# Patient Record
Sex: MALE | Race: WHITE | NOT HISPANIC OR LATINO | Employment: OTHER | URBAN - METROPOLITAN AREA
[De-identification: names, ages, dates, MRNs, and addresses within clinical notes are randomized per-mention and may not be internally consistent; named-entity substitution may affect disease eponyms.]

---

## 2019-06-24 ENCOUNTER — OFFICE VISIT (OUTPATIENT)
Dept: URGENT CARE | Age: 69
End: 2019-06-24
Payer: MEDICARE

## 2019-06-24 DIAGNOSIS — H65.91 OTITIS MEDIA, SEROUS, TM RUPTURE, RIGHT: Primary | ICD-10-CM

## 2019-06-24 DIAGNOSIS — H72.91 OTITIS MEDIA, SEROUS, TM RUPTURE, RIGHT: Primary | ICD-10-CM

## 2019-06-24 PROCEDURE — 69210 REMOVE IMPACTED EAR WAX UNI: CPT | Performed by: PHYSICIAN ASSISTANT

## 2019-06-24 PROCEDURE — G0463 HOSPITAL OUTPT CLINIC VISIT: HCPCS | Performed by: PHYSICIAN ASSISTANT

## 2019-06-24 PROCEDURE — 99203 OFFICE O/P NEW LOW 30 MIN: CPT | Performed by: PHYSICIAN ASSISTANT

## 2019-06-24 RX ORDER — OFLOXACIN 3 MG/ML
10 SOLUTION AURICULAR (OTIC) DAILY
Qty: 5 ML | Refills: 0 | Status: SHIPPED | OUTPATIENT
Start: 2019-06-24 | End: 2019-07-02

## 2019-06-24 RX ORDER — NEOMYCIN SULFATE, POLYMYXIN B SULFATE AND HYDROCORTISONE 10; 3.5; 1 MG/ML; MG/ML; [USP'U]/ML
SUSPENSION/ DROPS AURICULAR (OTIC)
COMMUNITY
Start: 2019-06-24 | End: 2019-06-24

## 2019-06-24 RX ORDER — CEFDINIR 300 MG/1
CAPSULE ORAL
COMMUNITY
Start: 2019-06-24 | End: 2019-06-24

## 2019-06-24 RX ORDER — AMOXICILLIN 500 MG/1
500 CAPSULE ORAL EVERY 8 HOURS SCHEDULED
Qty: 21 CAPSULE | Refills: 0 | Status: SHIPPED | OUTPATIENT
Start: 2019-06-24 | End: 2019-07-01

## 2019-06-25 VITALS
HEART RATE: 86 BPM | OXYGEN SATURATION: 96 % | RESPIRATION RATE: 18 BRPM | WEIGHT: 251 LBS | BODY MASS INDEX: 37.18 KG/M2 | TEMPERATURE: 99.1 F | HEIGHT: 69 IN | DIASTOLIC BLOOD PRESSURE: 96 MMHG | SYSTOLIC BLOOD PRESSURE: 176 MMHG

## 2019-07-02 ENCOUNTER — OFFICE VISIT (OUTPATIENT)
Dept: URGENT CARE | Age: 69
End: 2019-07-02
Payer: MEDICARE

## 2019-07-02 ENCOUNTER — HOSPITAL ENCOUNTER (EMERGENCY)
Facility: HOSPITAL | Age: 69
Discharge: HOME | End: 2019-07-02
Attending: EMERGENCY MEDICINE
Payer: MEDICARE

## 2019-07-02 VITALS
TEMPERATURE: 98.4 F | WEIGHT: 247.58 LBS | BODY MASS INDEX: 36.56 KG/M2 | DIASTOLIC BLOOD PRESSURE: 95 MMHG | HEART RATE: 74 BPM | RESPIRATION RATE: 18 BRPM | SYSTOLIC BLOOD PRESSURE: 205 MMHG | OXYGEN SATURATION: 96 %

## 2019-07-02 VITALS
OXYGEN SATURATION: 98 % | SYSTOLIC BLOOD PRESSURE: 192 MMHG | TEMPERATURE: 99.7 F | HEART RATE: 74 BPM | DIASTOLIC BLOOD PRESSURE: 120 MMHG | BODY MASS INDEX: 35.55 KG/M2 | RESPIRATION RATE: 20 BRPM | HEIGHT: 69 IN | WEIGHT: 240 LBS

## 2019-07-02 DIAGNOSIS — I10 HYPERTENSION, UNSPECIFIED TYPE: Primary | ICD-10-CM

## 2019-07-02 DIAGNOSIS — I10 HYPERTENSION: Primary | ICD-10-CM

## 2019-07-02 LAB
ANION GAP SERPL CALCULATED.3IONS-SCNC: 10 MMOL/L (ref 4–13)
BACTERIA UR QL AUTO: ABNORMAL /HPF
BILIRUB UR QL STRIP: NEGATIVE
BUN SERPL-MCNC: 11 MG/DL (ref 5–25)
CALCIUM SERPL-MCNC: 9.4 MG/DL (ref 8.3–10.1)
CHLORIDE SERPL-SCNC: 107 MMOL/L (ref 100–108)
CLARITY UR: CLEAR
CO2 SERPL-SCNC: 28 MMOL/L (ref 21–32)
COLOR UR: YELLOW
CREAT SERPL-MCNC: 0.84 MG/DL (ref 0.6–1.3)
GFR SERPL CREATININE-BSD FRML MDRD: 89 ML/MIN/1.73SQ M
GLUCOSE SERPL-MCNC: 106 MG/DL (ref 65–140)
GLUCOSE UR STRIP-MCNC: NEGATIVE MG/DL
HGB UR QL STRIP.AUTO: ABNORMAL
KETONES UR STRIP-MCNC: NEGATIVE MG/DL
LEUKOCYTE ESTERASE UR QL STRIP: NEGATIVE
NITRITE UR QL STRIP: NEGATIVE
NON-SQ EPI CELLS URNS QL MICRO: ABNORMAL /HPF
PH UR STRIP.AUTO: 5.5 [PH] (ref 4.5–8)
POTASSIUM SERPL-SCNC: 3.4 MMOL/L (ref 3.5–5.3)
PROT UR STRIP-MCNC: NEGATIVE MG/DL
RBC #/AREA URNS AUTO: ABNORMAL /HPF
SODIUM SERPL-SCNC: 145 MMOL/L (ref 136–145)
SP GR UR STRIP.AUTO: 1.01 (ref 1–1.03)
UROBILINOGEN UR QL STRIP.AUTO: 0.2 E.U./DL
WBC #/AREA URNS AUTO: ABNORMAL /HPF

## 2019-07-02 PROCEDURE — 80048 BASIC METABOLIC PNL TOTAL CA: CPT | Performed by: EMERGENCY MEDICINE

## 2019-07-02 PROCEDURE — 81001 URINALYSIS AUTO W/SCOPE: CPT

## 2019-07-02 PROCEDURE — 99283 EMERGENCY DEPT VISIT LOW MDM: CPT | Performed by: EMERGENCY MEDICINE

## 2019-07-02 PROCEDURE — 36415 COLL VENOUS BLD VENIPUNCTURE: CPT | Performed by: EMERGENCY MEDICINE

## 2019-07-02 PROCEDURE — 99283 EMERGENCY DEPT VISIT LOW MDM: CPT

## 2019-07-02 PROCEDURE — G0463 HOSPITAL OUTPT CLINIC VISIT: HCPCS | Performed by: PHYSICIAN ASSISTANT

## 2019-07-02 PROCEDURE — 99213 OFFICE O/P EST LOW 20 MIN: CPT | Performed by: PHYSICIAN ASSISTANT

## 2019-07-02 RX ORDER — ST. JOHN'S WORT 300 MG
1 CAPSULE ORAL DAILY
COMMUNITY
Start: 2018-05-30

## 2019-07-02 RX ORDER — CHLORTHALIDONE 50 MG/1
50 TABLET ORAL DAILY
COMMUNITY
Start: 2018-05-30 | End: 2019-07-02

## 2019-07-02 RX ORDER — OFLOXACIN 3 MG/ML
SOLUTION/ DROPS OPHTHALMIC
Refills: 0 | COMMUNITY
Start: 2019-06-24

## 2019-07-02 RX ORDER — AMLODIPINE BESYLATE 10 MG/1
10 TABLET ORAL DAILY
COMMUNITY
Start: 2018-05-30

## 2019-07-02 RX ORDER — LOSARTAN POTASSIUM AND HYDROCHLOROTHIAZIDE 25; 100 MG/1; MG/1
1 TABLET ORAL DAILY
COMMUNITY
Start: 2018-05-30

## 2019-07-02 RX ORDER — AMLODIPINE BESYLATE 10 MG/1
10 TABLET ORAL DAILY
Qty: 30 TABLET | Refills: 0 | Status: SHIPPED | OUTPATIENT
Start: 2019-07-02 | End: 2019-08-01

## 2019-07-02 RX ORDER — FOLIC ACID 1 MG/1
TABLET ORAL
COMMUNITY
End: 2019-07-02

## 2019-07-02 NOTE — ED ATTENDING ATTESTATION
I, Dan Severance, DO, saw and evaluated the patient  I have discussed the patient with the resident/non-physician practitioner and agree with the resident's/non-physician practitioner's findings, Plan of Care, and MDM as documented in the resident's/non-physician practitioner's note, except where noted  All available labs and Radiology studies were reviewed  I was present for key portions of any procedure(s) performed by the resident/non-physician practitioner and I was immediately available to provide assistance  At this point I agree with the current assessment done in the Emergency Department  I have conducted an independent evaluation of this patient a history and physical is as follows:    71 y o  M w/ h/o HTN p/w high BP  Pt had a /130 while at the ENT today and was referred to the urgent care  Patient had a high blood pressure reading of 180/126 while at urgent care, so he was told to go to the ER  Pt is supposed to be on meds, but hasn't been for the last 6 months because his medications were recalled  He has not attempted to see his PCP and reports he is afraid to take medications now  He states he checks his BP at home and is usually around 140  He has no symptoms currently  Plan: High BP - Will check BMP to r/o BILLIE  Will encourage PCP f/u      Critical Care Time  Procedures

## 2019-07-02 NOTE — ED PROVIDER NOTES
History  Chief Complaint   Patient presents with    Hypertension     patient reports had appt at ENT today and BP reading was high  Referred to an urgent care  BP high at urgent care  referred to ED  hx of HTN on medication up until 6 months ago  states all medications were recalled so never restarted on new BP meds  no complaints offered at this time  77-year-old male with a history of hypertension off his medications for the last 6 months since his blood pressure pills were called by the pharmacy presenting emergency department for evaluation of asymptomatic hypertension identified ENT visit today  He was being seen for a follow-up visit after he had a bleeding wound in his outer ear canal   He denies any pain today or any complaints at all but he was noted to be hypertensive with a systolic blood pressure in the 221K and a diastolic in the low 252Z at the clinic  He was referred to urgent care where the blood pressure persisted and then he was referred to the emergency department  Denies any headache, vision changes, hearing changes, abdominal pain, hematuria, or any changes in his urination  He notes that he was on a combination medicine that included losartan and hydrochlorothiazide as well as 1 other medication within the pill but does not remember the dosing  He has not followed up with his primary care physician since he self-discontinued these medicines  He reports that he had been monitoring his blood pressure at home and it usually was in the low 684G systolic early, but for the last several weeks his blood pressure cuff has been broken and he has not been monitoring his blood pressure  He does note that he has been excessively angry recently due to [place]  Prior to Admission Medications   Prescriptions Last Dose Informant Patient Reported? Taking?    Cetirizine HCl (ZYRTEC ALLERGY) 10 MG CAPS   Yes No   Sig: Take 10 mg by mouth daily   Multiple Vitamins-Minerals (OCUVITE ADULT 50+ PO)  Self Yes No   Sig: Take by mouth   St Johns Wort 300 MG CAPS   Yes No   Sig: Take 1 capsule by mouth daily   amLODIPine (NORVASC) 10 mg tablet   Yes No   Sig: Take 10 mg by mouth daily   amoxicillin (AMOXIL) 500 mg capsule  Self No No   Sig: Take 1 capsule (500 mg total) by mouth every 8 (eight) hours for 7 days   aspirin (ECOTRIN LOW STRENGTH) 81 mg EC tablet  Self Yes No   Sig: Take 81 mg by mouth daily   losartan-hydrochlorothiazide (HYZAAR) 100-25 MG per tablet   Yes No   Sig: Take 1 tablet by mouth daily   ofloxacin (OCUFLOX) 0 3 % ophthalmic solution   Yes No   Sig: ADMINISTER 10 DROPS TO THE RIGHT EAR DAILY FOR 7 DAYS   tetanus-diphtheria-acellular pertussis (BOOSTRIX) injection   Yes No   Sig: TO BE ADMINISTERED BY PHARMACIST FOR IMMUNIZATION      Facility-Administered Medications: None       Past Medical History:   Diagnosis Date    Hearing loss in left ear 2012    sudden loss- worked up by ENT in [place]- normal mri per pt   Hypertension        Past Surgical History:   Procedure Laterality Date    CARPAL TUNNEL RELEASE      KNEE ARTHROPLASTY         History reviewed  No pertinent family history  I have reviewed and agree with the history as documented  Social History     Tobacco Use    Smoking status: Former Smoker    Smokeless tobacco: Never Used   Substance Use Topics    Alcohol use: Yes     Comment: social     Drug use: Never        Review of Systems   Constitutional: Negative for chills and fever  HENT: Negative for congestion and sore throat  Eyes: Negative for visual disturbance  Respiratory: Negative for cough and shortness of breath  Cardiovascular: Negative for chest pain and palpitations  Gastrointestinal: Negative for abdominal pain, diarrhea, nausea and vomiting  Genitourinary: Negative for difficulty urinating and dysuria  Musculoskeletal: Negative for myalgias  Skin: Negative for rash     Neurological: Negative for weakness, light-headedness, numbness and headaches  Physical Exam  ED Triage Vitals [07/02/19 1632]   Temperature Pulse Respirations Blood Pressure SpO2   98 4 °F (36 9 °C) 79 18 (!) 213/101 98 %      Temp Source Heart Rate Source Patient Position - Orthostatic VS BP Location FiO2 (%)   Temporal Monitor Sitting Right arm --      Pain Score       No Pain             Orthostatic Vital Signs  Vitals:    07/02/19 1632 07/02/19 1739   BP: (!) 213/101 (!) 205/95   Pulse: 79 74   Patient Position - Orthostatic VS: Sitting Lying       Physical Exam   Constitutional: He is oriented to person, place, and time  He appears well-developed and well-nourished  HENT:   Head: Normocephalic and atraumatic  Mouth/Throat: Oropharynx is clear and moist    Eyes: Pupils are equal, round, and reactive to light  EOM are normal    Neck: Normal range of motion  Neck supple  Cardiovascular: Normal rate, regular rhythm, normal heart sounds and intact distal pulses  Pulmonary/Chest: No respiratory distress  Musculoskeletal: Normal range of motion  He exhibits no edema or deformity  Neurological: He is alert and oriented to person, place, and time  Coordination normal    Skin: Skin is warm and dry  Psychiatric: He has a normal mood and affect  His behavior is normal    Nursing note and vitals reviewed        ED Medications  Medications - No data to display    Diagnostic Studies  Results Reviewed     Procedure Component Value Units Date/Time    Urine Microscopic [[id number]]  (Abnormal) Collected:  07/02/19 1743    Lab Status:  Final result Specimen:  Urine, Clean Catch Updated:  07/02/19 1810     RBC, UA 1-2 /hpf      WBC, UA 0-1 /hpf      Epithelial Cells Occasional /hpf      Bacteria, UA None Seen /hpf     Basic metabolic panel [[id number]]  (Abnormal) Collected:  07/02/19 1732    Lab Status:  Final result Specimen:  Blood from Arm, Right Updated:  07/02/19 1747     Sodium 145 mmol/L      Potassium 3 4 mmol/L      Chloride 107 mmol/L CO2 28 mmol/L      ANION GAP 10 mmol/L      BUN 11 mg/dL      Creatinine 0 84 mg/dL      Glucose 106 mg/dL      Calcium 9 4 mg/dL      eGFR 89 ml/min/1 73sq m     Narrative:       [place] guidelines for Chronic Kidney Disease (CKD):     Stage 1 with normal or high GFR (GFR > 90 mL/min/1 73 square meters)    Stage 2 Mild CKD (GFR = 60-89 mL/min/1 73 square meters)    Stage 3A Moderate CKD (GFR = 45-59 mL/min/1 73 square meters)    Stage 3B Moderate CKD (GFR = 30-44 mL/min/1 73 square meters)    Stage 4 Severe CKD (GFR = 15-29 mL/min/1 73 square meters)    Stage 5 End Stage CKD (GFR <15 mL/min/1 73 square meters)  Note: GFR calculation is accurate only with a steady state creatinine    POCT urinalysis dipstick [[id number]]  (Abnormal) Resulted:  07/02/19 1732    Lab Status:  Final result Specimen:  Urine Updated:  07/02/19 1734    ED Urine Macroscopic [[id number]]  (Abnormal) Collected:  07/02/19 1743    Lab Status:  Final result Specimen:  Urine Updated:  07/02/19 1732     Color, UA Yellow     Clarity, UA Clear     pH, UA 5 5     Leukocytes, UA Negative     Nitrite, UA Negative     Protein, UA Negative mg/dl      Glucose, UA Negative mg/dl      Ketones, UA Negative mg/dl      Urobilinogen, UA 0 2 E U /dl      Bilirubin, UA Negative     Blood, UA Trace     Specific [place], UA 1 015    Narrative:       CLINITEK RESULT                 No orders to display         Procedures  Procedures        ED Course  ED Course as of Jul 03 0042 Tue Jul 02, 2019   1821 Blood pressure remained elevated in the emergency department  Patient continues to be asymptomatic  No signs of end-organ injury on laboratory evaluation  Due to his persistently elevated blood pressure did discharge him on his regular dose of amlodipine, 10 mg and instructed to follow up with his PCP in 1 week  Return precautions discussed for signs of stroke, chest pain, abdominal pain, and hematuria  MDM  Number of Diagnoses or Management Options  Diagnosis management comments: 51-year-old male with asymptomatic hypertension after self discontinuing his medications 6 months ago  Has not had routine blood work in over 1 year  We will check a BMP and a urinalysis today  His blood pressure is elevated above 200 on presentation, will monitor in the emergency department  He is reluctant to restart medications today and says that his blood pressure is typically is higher while at a physician's office  The blood work and urinalysis are normal will recommend he follow up with his primary care physician within the next week for repeat blood pressure monitoring and possibly starting medications  Disposition  Final diagnoses:   Hypertension     Time reflects when diagnosis was documented in both MDM as applicable and the Disposition within this note     Time User Action Codes Description Comment    7/2/2019  5:43 PM Power Garcia Add [I10] Hypertension       ED Disposition     ED Disposition Condition Date/Time Comment    Discharge Stable Tue Jul 2, 2019  6:13 PM Coy Alaniz discharge to home/self care  Follow-up Information     Follow up With Specialties Details Why Contact Info    Chela May MD Internal Medicine Schedule an appointment as soon as possible for a visit in 1 week  [facility]  [place] [address]  [phone]            Discharge Medication List as of 7/2/2019  6:14 PM      START taking these medications    Details   !! amLODIPine (NORVASC) 10 mg tablet Take 1 tablet (10 mg total) by mouth daily for 30 days, Starting Tue 7/2/2019, Until Thu 8/1/2019, Print       !! - Potential duplicate medications found  Please discuss with provider        CONTINUE these medications which have NOT CHANGED    Details   !! amLODIPine (NORVASC) 10 mg tablet Take 10 mg by mouth daily, Starting Wed 5/30/2018, Historical Med      aspirin ([address]) 81 mg EC tablet Take 81 mg by mouth daily, Historical Med      Cetirizine HCl (ZYRTEC ALLERGY) 10 MG CAPS Take 10 mg by mouth daily, Starting Wed 5/30/2018, Historical Med      losartan-hydrochlorothiazide (HYZAAR) 100-25 MG per tablet Take 1 tablet by mouth daily, Starting Wed 5/30/2018, Historical Med      Multiple Vitamins-Minerals (OCUVITE ADULT 50+ PO) Take by mouth, Historical Med      ofloxacin (OCUFLOX) 0 3 % ophthalmic solution ADMINISTER 10 DROPS TO THE RIGHT EAR DAILY FOR 7 DAYS, Historical Med      St Johns Wort 300 MG CAPS Take 1 capsule by mouth daily, Starting Wed 5/30/2018, Historical Med      tetanus-diphtheria-acellular pertussis (BOOSTRIX) injection TO BE ADMINISTERED BY PHARMACIST FOR IMMUNIZATION, Historical Med       !! - Potential duplicate medications found  Please discuss with provider  STOP taking these medications       amoxicillin (AMOXIL) 500 mg capsule Comments:   Reason for Stopping:             No discharge procedures on file  ED Provider  Attending physically available and evaluated Navi Adkins I managed the patient along with the ED Attending      Electronically Signed by         Fidencio Chaves MD  07/03/19 7309

## 2019-07-02 NOTE — PROGRESS NOTES
[facility]        NAME: Timmy Ortiz is a 71 y o  male  : 1950    MRN: [id number]  DATE: 2019  TIME: 4:19 PM    Assessment and Plan   Hypertension, unspecified type [I10]  1  Hypertension, unspecified type       Patient was seen at [facility] today with a blood pressure reading 200/130  Patient was told to go to ER, he came here instead  Patient continues to have high blood pressure 180/126 manual   Patient otherwise asymptomatic  It is in patient's best interest to go to ER for further management  I offered ambulance to patient or to have patient picked up by friend  I advised patient not to drive  Patient refuses and states that he will go on his own  Had patient sign an AMA form  Patient Instructions       Go to ER    Chief Complaint     Chief Complaint   Patient presents with    Hypertension     Pt was at the ENT office today and when they checked his BP it was 200/130  PT was taking BP medications 6 months ago but states he stopped because the meds he was on were recalled  History of Present Illness       Hypertension   Chronicity: Pt has hx htn but states he thought it was under control  Not currently on  medicaitons for this  Episode onset: Patient was seen at [facility] today with a blood pressure reading 200/130  Patient was told to go to ER, he came here instead  Patient continues to have high blood pressure 180/126 manual   Patient otherwise asymptomatic  The problem is unchanged  Pertinent negatives include no blurred vision, chest pain, headaches, malaise/fatigue, palpitations, shortness of breath or sweats  There are no associated agents to hypertension  Risk factors for coronary artery disease include obesity, male gender and dyslipidemia (Hx HTN)  Compliance problems: Pt states he stopped taking medications when his BP medication was recalled  Believed he was under control at that time          Review of Systems   Review of Systems   Constitutional: Negative for chills, diaphoresis, fatigue, fever and malaise/fatigue  HENT: Negative for congestion, postnasal drip, sinus pressure, sore throat and trouble swallowing  Eyes: Negative  Negative for blurred vision  Respiratory: Negative for shortness of breath and wheezing  Cardiovascular: Negative  Negative for chest pain and palpitations  Gastrointestinal: Negative for abdominal distention, diarrhea, nausea and vomiting  Endocrine: Negative  Genitourinary: Negative for dysuria  Musculoskeletal: Negative  Skin: Negative for rash  Allergic/Immunologic: Negative  Neurological: Negative  Negative for light-headedness and headaches  Hematological: Negative  Psychiatric/Behavioral: Negative            Current Medications       Current Outpatient Medications:     aspirin (ECOTRIN LOW STRENGTH) 81 mg EC tablet, Take 81 mg by mouth daily, Disp: , Rfl:     Cetirizine HCl (ZYRTEC ALLERGY) 10 MG CAPS, Take 10 mg by mouth daily, Disp: , Rfl:     Multiple Vitamins-Minerals (OCUVITE ADULT 50+ PO), Take by mouth, Disp: , Rfl:     ofloxacin (OCUFLOX) 0 3 % ophthalmic solution, ADMINISTER 10 DROPS TO THE RIGHT EAR DAILY FOR 7 DAYS, Disp: , Rfl: 0    tetanus-diphtheria-acellular pertussis (BOOSTRIX) injection, TO BE ADMINISTERED BY PHARMACIST FOR IMMUNIZATION, Disp: , Rfl:     amLODIPine (NORVASC) 10 mg tablet, Take 10 mg by mouth daily, Disp: , Rfl:     losartan-hydrochlorothiazide (HYZAAR) 100-25 MG per tablet, Take 1 tablet by mouth daily, Disp: , Rfl:     St Johns Wort 300 MG CAPS, Take 1 capsule by mouth daily, Disp: , Rfl:     Current Allergies     Allergies as of 07/02/2019    (No Known Allergies)            The following portions of the patient's history were reviewed and updated as appropriate: allergies, current medications, past family history, past medical history, past social history, past surgical history and problem list      Past Medical History:   Diagnosis Date    Hearing loss in left ear 2012    sudden loss- worked up by ENT in [place]- normal mri per pt   Hypertension        Past Surgical History:   Procedure Laterality Date    CARPAL TUNNEL RELEASE      KNEE ARTHROPLASTY         History reviewed  No pertinent family history  Medications have been verified  Objective   BP (!) 192/120 (BP Location: Right arm, Patient Position: Sitting, Cuff Size: Large) Comment: Manual  Pulse 74   Temp 99 7 °F (37 6 °C)   Resp 20   Ht 5' 9" (1 753 m)   Wt 109 kg (240 lb)   SpO2 98%   BMI 35 44 kg/m²        Physical Exam     Physical Exam   Constitutional: He is oriented to person, place, and time  He appears well-developed and well-nourished  No distress  HENT:   Head: Normocephalic and atraumatic  Nose: Nose normal    Mouth/Throat: Oropharynx is clear and moist  No oropharyngeal exudate  Eyes: Pupils are equal, round, and reactive to light  Conjunctivae and EOM are normal  Right eye exhibits no discharge  Left eye exhibits no discharge  Neck: Normal range of motion  Neck supple  Cardiovascular: Normal rate, regular rhythm, normal heart sounds and intact distal pulses  Pulmonary/Chest: Effort normal and breath sounds normal  No respiratory distress  He has no wheezes  He has no rales  Musculoskeletal: He exhibits no edema  Neurological: He is alert and oriented to person, place, and time  Skin: Skin is warm  Capillary refill takes less than 2 seconds  No rash noted  He is not diaphoretic  Nursing note and vitals reviewed

## 2020-08-03 ENCOUNTER — HOSPITAL ENCOUNTER (EMERGENCY)
Facility: HOSPITAL | Age: 70
Discharge: HOME | End: 2020-08-03
Attending: EMERGENCY MEDICINE | Admitting: EMERGENCY MEDICINE
Payer: MEDICARE

## 2020-08-03 ENCOUNTER — APPOINTMENT (EMERGENCY)
Dept: RADIOLOGY | Facility: HOSPITAL | Age: 70
End: 2020-08-03
Payer: MEDICARE

## 2020-08-03 VITALS
DIASTOLIC BLOOD PRESSURE: 76 MMHG | BODY MASS INDEX: 35.49 KG/M2 | TEMPERATURE: 98.3 F | WEIGHT: 240.3 LBS | HEART RATE: 52 BPM | RESPIRATION RATE: 18 BRPM | OXYGEN SATURATION: 97 % | SYSTOLIC BLOOD PRESSURE: 171 MMHG

## 2020-08-03 DIAGNOSIS — R07.9 CHEST PAIN: ICD-10-CM

## 2020-08-03 DIAGNOSIS — R06.00 DYSPNEA: Primary | ICD-10-CM

## 2020-08-03 LAB
ANION GAP SERPL CALCULATED.3IONS-SCNC: 9 MMOL/L (ref 4–13)
ATRIAL RATE: 61 BPM
BASOPHILS # BLD AUTO: 0.02 THOUSANDS/ΜL (ref 0–0.1)
BASOPHILS NFR BLD AUTO: 0 % (ref 0–1)
BUN SERPL-MCNC: 11 MG/DL (ref 5–25)
CALCIUM SERPL-MCNC: 8.5 MG/DL (ref 8.3–10.1)
CHLORIDE SERPL-SCNC: 104 MMOL/L (ref 100–108)
CO2 SERPL-SCNC: 26 MMOL/L (ref 21–32)
CREAT SERPL-MCNC: 0.78 MG/DL (ref 0.6–1.3)
EOSINOPHIL # BLD AUTO: 0.06 THOUSAND/ΜL (ref 0–0.61)
EOSINOPHIL NFR BLD AUTO: 1 % (ref 0–6)
ERYTHROCYTE [DISTWIDTH] IN BLOOD BY AUTOMATED COUNT: 12.7 % (ref 11.6–15.1)
GFR SERPL CREATININE-BSD FRML MDRD: 91 ML/MIN/1.73SQ M
GLUCOSE SERPL-MCNC: 106 MG/DL (ref 65–140)
HCT VFR BLD AUTO: 44.5 % (ref 36.5–49.3)
HGB BLD-MCNC: 15.1 G/DL (ref 12–17)
IMM GRANULOCYTES # BLD AUTO: 0.01 THOUSAND/UL (ref 0–0.2)
IMM GRANULOCYTES NFR BLD AUTO: 0 % (ref 0–2)
LYMPHOCYTES # BLD AUTO: 1.84 THOUSANDS/ΜL (ref 0.6–4.47)
LYMPHOCYTES NFR BLD AUTO: 30 % (ref 14–44)
MCH RBC QN AUTO: 31.1 PG (ref 26.8–34.3)
MCHC RBC AUTO-ENTMCNC: 33.9 G/DL (ref 31.4–37.4)
MCV RBC AUTO: 92 FL (ref 82–98)
MONOCYTES # BLD AUTO: 0.43 THOUSAND/ΜL (ref 0.17–1.22)
MONOCYTES NFR BLD AUTO: 7 % (ref 4–12)
NEUTROPHILS # BLD AUTO: 3.7 THOUSANDS/ΜL (ref 1.85–7.62)
NEUTS SEG NFR BLD AUTO: 62 % (ref 43–75)
NRBC BLD AUTO-RTO: 0 /100 WBCS
NT-PROBNP SERPL-MCNC: 74 PG/ML
P AXIS: 49 DEGREES
PLATELET # BLD AUTO: 215 THOUSANDS/UL (ref 149–390)
PMV BLD AUTO: 9.5 FL (ref 8.9–12.7)
POTASSIUM SERPL-SCNC: 3.5 MMOL/L (ref 3.5–5.3)
PR INTERVAL: 222 MS
QRS AXIS: 96 DEGREES
QRSD INTERVAL: 104 MS
QT INTERVAL: 428 MS
QTC INTERVAL: 430 MS
RBC # BLD AUTO: 4.85 MILLION/UL (ref 3.88–5.62)
SODIUM SERPL-SCNC: 139 MMOL/L (ref 136–145)
T WAVE AXIS: 2 DEGREES
TROPONIN I SERPL-MCNC: 0.02 NG/ML
VENTRICULAR RATE: 61 BPM
WBC # BLD AUTO: 6.06 THOUSAND/UL (ref 4.31–10.16)

## 2020-08-03 PROCEDURE — 36415 COLL VENOUS BLD VENIPUNCTURE: CPT | Performed by: EMERGENCY MEDICINE

## 2020-08-03 PROCEDURE — 80048 BASIC METABOLIC PNL TOTAL CA: CPT | Performed by: EMERGENCY MEDICINE

## 2020-08-03 PROCEDURE — 93010 ELECTROCARDIOGRAM REPORT: CPT | Performed by: INTERNAL MEDICINE

## 2020-08-03 PROCEDURE — 84484 ASSAY OF TROPONIN QUANT: CPT | Performed by: EMERGENCY MEDICINE

## 2020-08-03 PROCEDURE — 93005 ELECTROCARDIOGRAM TRACING: CPT

## 2020-08-03 PROCEDURE — 99283 EMERGENCY DEPT VISIT LOW MDM: CPT | Performed by: EMERGENCY MEDICINE

## 2020-08-03 PROCEDURE — 99285 EMERGENCY DEPT VISIT HI MDM: CPT

## 2020-08-03 PROCEDURE — 71046 X-RAY EXAM CHEST 2 VIEWS: CPT

## 2020-08-03 PROCEDURE — 85025 COMPLETE CBC W/AUTO DIFF WBC: CPT | Performed by: EMERGENCY MEDICINE

## 2020-08-03 PROCEDURE — 83880 ASSAY OF NATRIURETIC PEPTIDE: CPT | Performed by: EMERGENCY MEDICINE

## 2020-08-03 RX ORDER — CHLORAL HYDRATE 500 MG
CAPSULE ORAL DAILY
COMMUNITY

## 2020-08-03 NOTE — ED PROVIDER NOTES
History  Chief Complaint   Patient presents with    Shortness of Breath     Pt  reports sob, mild cough, sneezing, congestion since yesterday  History provided by:  Patient   used: No    Shortness of Breath   Severity:  Moderate  Onset quality:  Gradual  Duration:  1 day  Timing:  Intermittent  Progression:  Resolved  Chronicity:  New  Context comment:  Sinusitis, blocked nose, also had chest pain yesterday  Relieved by:  Nothing  Worsened by:  Nothing  Ineffective treatments:  None tried  Associated symptoms: chest pain and cough    Associated symptoms: no abdominal pain, no fever, no headaches, no neck pain, no rash, no sore throat and no vomiting    Chest pain:     Quality: pressure      Severity:  Moderate    Onset quality:  Gradual    Duration: 15 minutes yesterday, now resolved  Timing:  Sporadic    Progression:  Resolved    Chronicity:  New  Cough:     Cough characteristics:  Non-productive    Sputum characteristics:  Nondescript    Severity:  Mild    Onset quality:  Gradual    Duration:  1 day    Timing:  Intermittent    Progression:  Waxing and waning    Chronicity:  New  Risk factors comment:  HTN      Prior to Admission Medications   Prescriptions Last Dose Informant Patient Reported? Taking?    Cetirizine HCl (ZYRTEC ALLERGY) 10 MG CAPS Not Taking at Unknown time  Yes No   Sig: Take 10 mg by mouth daily   Multiple Vitamins-Minerals (OCUVITE ADULT 50+ PO)  Self Yes Yes   Sig: Take by mouth   Omega-3 Fatty Acids (fish oil) 1,000 mg   Yes Yes   Sig: Take by mouth daily Unknown dose   [employer] Wort 300 MG CAPS   Yes Yes   Sig: Take 1 capsule by mouth daily   amLODIPine (NORVASC) 10 mg tablet   Yes Yes   Sig: Take 10 mg by mouth daily   aspirin (ECOTRIN LOW STRENGTH) 81 mg EC tablet  Self Yes Yes   Sig: Take 81 mg by mouth daily   losartan-hydrochlorothiazide (HYZAAR) 100-25 MG per tablet   Yes Yes   Sig: Take 1 tablet by mouth daily   ofloxacin (OCUFLOX) 0 3 % ophthalmic solution Not Taking at Unknown time  Yes No   Sig: ADMINISTER 10 DROPS TO THE RIGHT EAR DAILY FOR 7 DAYS   tetanus-diphtheria-acellular pertussis (BOOSTRIX) injection   Yes Yes   Sig: TO BE ADMINISTERED BY PHARMACIST FOR IMMUNIZATION      Facility-Administered Medications: None       Past Medical History:   Diagnosis Date    Hearing loss in left ear 2012    sudden loss- worked up by ENT in [place]- normal mri per pt   Hypertension        Past Surgical History:   Procedure Laterality Date    CARPAL TUNNEL RELEASE      KNEE ARTHROPLASTY         History reviewed  No pertinent family history  I have reviewed and agree with the history as documented  E-Cigarette/Vaping    E-Cigarette Use Never User      E-Cigarette/Vaping Substances     Social History     Tobacco Use    Smoking status: Former Smoker    Smokeless tobacco: Never Used   Substance Use Topics    Alcohol use: Yes     Comment: social     Drug use: Never       Review of Systems   Constitutional: Negative for chills and fever  HENT: Negative for facial swelling, sore throat and trouble swallowing  Eyes: Negative for pain and visual disturbance  Respiratory: Positive for cough and shortness of breath  Cardiovascular: Positive for chest pain  Negative for leg swelling  Gastrointestinal: Negative for abdominal pain, blood in stool, diarrhea, nausea and vomiting  Genitourinary: Negative for dysuria and flank pain  Musculoskeletal: Negative for back pain, neck pain and neck stiffness  Skin: Negative for pallor and rash  Allergic/Immunologic: Negative for environmental allergies and immunocompromised state  Neurological: Negative for dizziness and headaches  Hematological: Negative for adenopathy  Does not bruise/bleed easily  Psychiatric/Behavioral: Negative for agitation and behavioral problems  All other systems reviewed and are negative  Physical Exam  Physical Exam  Vitals signs and nursing note reviewed  Constitutional:       General: He is not in acute distress  Appearance: He is well-developed  HENT:      Head: Normocephalic and atraumatic  Neck:      Musculoskeletal: Normal range of motion and neck supple  Cardiovascular:      Rate and Rhythm: Normal rate and regular rhythm  Heart sounds: Normal heart sounds  Pulmonary:      Effort: Pulmonary effort is normal       Breath sounds: Normal breath sounds  Abdominal:      General: Bowel sounds are normal       Palpations: Abdomen is soft  Tenderness: There is no abdominal tenderness  There is no guarding or rebound  Musculoskeletal: Normal range of motion  Right lower leg: No edema  Skin:     General: Skin is warm and dry  Neurological:      Mental Status: He is alert and oriented to person, place, and time           Vital Signs  ED Triage Vitals   Temperature Pulse Respirations Blood Pressure SpO2   08/03/20 1116 08/03/20 1116 08/03/20 1116 08/03/20 1116 08/03/20 1116   98 3 °F (36 8 °C) 68 18 (!) 197/97 98 %      Temp Source Heart Rate Source Patient Position - Orthostatic VS BP Location FiO2 (%)   08/03/20 1116 08/03/20 1328 08/03/20 1328 08/03/20 1328 --   Temporal Monitor Lying Right arm       Pain Score       08/03/20 1116       No Pain           Vitals:    08/03/20 1116 08/03/20 1328   BP: (!) 197/97 (!) 171/76   Pulse: 68 (!) 52   Patient Position - Orthostatic VS:  Lying         Visual Acuity      ED Medications  Medications - No data to display    Diagnostic Studies  Results Reviewed     Procedure Component Value Units Date/Time    Basic metabolic panel [[id number]] Collected:  08/03/20 1233    Lab Status:  Final result Specimen:  Blood from Hand, Left Updated:  08/03/20 1303     Sodium 139 mmol/L      Potassium 3 5 mmol/L      Chloride 104 mmol/L      CO2 26 mmol/L      ANION GAP 9 mmol/L      BUN 11 mg/dL      Creatinine 0 78 mg/dL      Glucose 106 mg/dL      Calcium 8 5 mg/dL      eGFR 91 ml/min/1 73sq m     Narrative: National Kidney Disease Foundation guidelines for Chronic Kidney Disease (CKD):     Stage 1 with normal or high GFR (GFR > 90 mL/min/1 73 square meters)    Stage 2 Mild CKD (GFR = 60-89 mL/min/1 73 square meters)    Stage 3A Moderate CKD (GFR = 45-59 mL/min/1 73 square meters)    Stage 3B Moderate CKD (GFR = 30-44 mL/min/1 73 square meters)    Stage 4 Severe CKD (GFR = 15-29 mL/min/1 73 square meters)    Stage 5 End Stage CKD (GFR <15 mL/min/1 73 square meters)  Note: GFR calculation is accurate only with a steady state creatinine    NT-BNP PRO [[id number]]  (Normal) Collected:  08/03/20 1233    Lab Status:  Final result Specimen:  Blood from Hand, Left Updated:  08/03/20 1303     NT-proBNP 74 pg/mL     Troponin I [[id number]]  (Normal) Collected:  08/03/20 1233    Lab Status:  Final result Specimen:  Blood from Hand, Left Updated:  08/03/20 1259     Troponin I 0 02 ng/mL     CBC and differential [[id number]] Collected:  08/03/20 1233    Lab Status:  Final result Specimen:  Blood from Hand, Left Updated:  08/03/20 1239     WBC 6 06 Thousand/uL      RBC 4 85 Million/uL      Hemoglobin 15 1 g/dL      Hematocrit 44 5 %      MCV 92 fL      MCH 31 1 pg      MCHC 33 9 g/dL      RDW 12 7 %      MPV 9 5 fL      Platelets 117 Thousands/uL      nRBC 0 /100 WBCs      Neutrophils Relative 62 %      Immat GRANS % 0 %      Lymphocytes Relative 30 %      Monocytes Relative 7 %      Eosinophils Relative 1 %      Basophils Relative 0 %      Neutrophils Absolute 3 70 Thousands/µL      Immature Grans Absolute 0 01 Thousand/uL      Lymphocytes Absolute 1 84 Thousands/µL      Monocytes Absolute 0 43 Thousand/µL      Eosinophils Absolute 0 06 Thousand/µL      Basophils Absolute 0 02 Thousands/µL                  XR chest 2 views   Final Result by Curtis Marques MD (08/03 1241)      No acute cardiopulmonary disease              Workstation performed: [id number]                    Procedures  ECG 12 Lead Documentation Only    Date/Time: 8/3/2020 12:03 PM  Performed by: Leigh Ann Patricia MD  Authorized by: Leigh Ann Patricia MD     Indications / Diagnosis:  Chest pain  ECG reviewed by me, the ED Provider: yes    Patient location:  ED  Interpretation:     Interpretation: normal    Rate:     ECG rate assessment: normal    Rhythm:     Rhythm: sinus rhythm    Ectopy:     Ectopy: none    QRS:     QRS axis:  Normal  Conduction:     Conduction: normal    ST segments:     ST segments:  Normal  T waves:     T waves: normal               ED Course  ED Course as of Aug 03 1517   Mon Aug 03, 2020   1307 WBC: 6 06   1307 Hemoglobin: 15 1   1307 Platelet Count: 511   1307 Sodium: 139   1307 Potassium: 3 5   1307 Troponin I: 0 02   1307 Labs, EKG, CXR reviewed, no significant acute abnormality  Patient re-evaluated and informed about the workup results, no active chest pain or dyspnea, low suspicion for ACS at this time; Heart score 4, however chest pain yesterday, no pain now, discussed with patient about ACS rule out decision making, patient feels fine, thinks its due to his sinus symptoms and only reason he came was due to advice on Virtual Visit; wants to go home; advised close follow up with PCP, may need outpatient stress test; patient shows understanding of instructions and need for close follow up  RTED for recurrent chest pain or dyspnea     NT-proBNP: 74             HEART Risk Score      Most Recent Value   Heart Score Risk Calculator   History  1 Filed at: 08/03/2020 1318   ECG  0 Filed at: 08/03/2020 1318   Age  2 Filed at: 08/03/2020 1318   Risk Factors  1 Filed at: 08/03/2020 1318   Troponin  0 Filed at: 08/03/2020 1318   HEART Score  4 Filed at: 08/03/2020 1318                          Nica Subramanian' Criteria for PE      Most Recent Value   Jim' Criteria for PE   Clinical signs and symptoms of DVT  0 Filed at: 08/03/2020 1213   PE is primary diagnosis or equally likely  0 Filed at: 08/03/2020 1213   HR >100  0 Filed at: 08/03/2020 1213 Immobilization at least 3 days or Surgery in the previous 4 weeks  0 Filed at: 08/03/2020 1213   Previous, objectively diagnosed PE or DVT  0 Filed at: 08/03/2020 1213   Hemoptysis  0 Filed at: 08/03/2020 1213   Malignancy with treatment within 6 months or palliative  0 Filed at: 08/03/2020 1213   Wells' Criteria Total  0 Filed at: 08/03/2020 1213                  [facility]  Number of Diagnoses or Management Options  Chest pain: new and requires workup  Dyspnea: new and requires workup  Diagnosis management comments: Patient is a 51-year-old male, history of hypertension, recent sinus issues, about nose, comes in with complaints of chest pain that happened yesterday, lasted for about 15 minutes, resolved spontaneously, thereafter has been feeling shortness of breath intermittently, associated with cough, no fever, no COVID exposure, no recent travel  No prior history of cardiovascular disease, no history of DVT /PE  On exam, no acute distress:  Vital signs are stable, lungs are clear, heart sounds normal, no peripheral edema, no calf tenderness or swelling  Impression: Nonspecific chest pain, resolved, shortness of breath,  Low Wells risk, patient thinks that his symptoms are probably related to sinus disease, block nose, however due to patient's age and risk factor, will get EKG, troponin, BNP, chest x-ray         Amount and/or Complexity of Data Reviewed  Clinical lab tests: reviewed and ordered  Tests in the radiology section of CPT®: ordered and reviewed  Tests in the medicine section of CPT®: ordered and reviewed  Independent visualization of images, tracings, or specimens: yes          Disposition  Final diagnoses:   Dyspnea   Chest pain     Time reflects when diagnosis was documented in both MDM as applicable and the Disposition within this note     Time User Action Codes Description Comment    8/3/2020 12:13 PM Vidal Bhupendra Aries [R06 [id number]] Dyspnea     8/3/2020 12:13 PM Vidal Bhupendra Aries [R07 [id number]] Chest pain ED Disposition     ED Disposition Condition Date/Time Comment    Discharge Stable Mon Aug 3, 2020  1:10 PM Jossue Blas discharge to home/self care  Follow-up Information     Follow up With Specialties Details Why Contact Info Additional Information    Rhea Nichole MD Internal Medicine Schedule an appointment as soon as possible for a visit   [address] [facility] [address] [place] [address] Emergency Department Emergency Medicine  If symptoms worsen [facility] [id number]  [phone] [place] ED, [address] [facility] , [place], [place], [address] [facility] FOLLOW UP WITH YOUR FAMILY DOCTOR  YOU MAY NEED OUTPATIENT STRESS TEST FOR HEART  Discharge Medication List as of 8/3/2020  1:11 PM      CONTINUE these medications which have NOT CHANGED    Details   amLODIPine (NORVASC) 10 mg tablet Take 10 mg by mouth daily, Starting Wed 5/30/2018, Historical Med      aspirin (ECOTRIN LOW STRENGTH) 81 mg EC tablet Take 81 mg by mouth daily, Historical Med      losartan-hydrochlorothiazide (HYZAAR) 100-25 MG per tablet Take 1 tablet by mouth daily, Starting Wed 5/30/2018, Historical Med      Multiple Vitamins-Minerals (OCUVITE ADULT 50+ PO) Take by mouth, Historical Med      Omega-3 Fatty Acids (fish oil) 1,000 mg Take by mouth daily Unknown dose, Historical Med      [employer] Wort 300 MG CAPS Take 1 capsule by mouth daily, Starting Wed 5/30/2018, Historical Med      tetanus-diphtheria-acellular pertussis (BOOSTRIX) injection TO BE ADMINISTERED BY PHARMACIST FOR IMMUNIZATION, Historical Med      Cetirizine HCl (ZYRTEC ALLERGY) 10 MG CAPS Take 10 mg by mouth daily, Starting Wed 5/30/2018, Historical Med      ofloxacin (OCUFLOX) 0 3 % ophthalmic solution ADMINISTER 10 DROPS TO THE RIGHT EAR DAILY FOR 7 DAYS, Historical Med           No discharge procedures on file      PDMP Review     None          ED Provider  Electronically Signed by           Fauzia Toney MD  08/03/20 [id number]